# Patient Record
Sex: FEMALE | URBAN - METROPOLITAN AREA
[De-identification: names, ages, dates, MRNs, and addresses within clinical notes are randomized per-mention and may not be internally consistent; named-entity substitution may affect disease eponyms.]

---

## 2017-03-16 ENCOUNTER — TELEPHONE (OUTPATIENT)
Dept: ORTHOPEDICS | Facility: CLINIC | Age: 22
End: 2017-03-16

## 2017-03-16 DIAGNOSIS — M25.562 LEFT KNEE PAIN: Primary | ICD-10-CM

## 2017-03-16 NOTE — TELEPHONE ENCOUNTER
Patient's father called in saying Jonathon is c/o increased left knee pain upon walking and using the stairs x 2 days. Does not note redness, swelling or warmth. She will follow up with Dr. Solomon.

## 2017-03-20 ENCOUNTER — OFFICE VISIT (OUTPATIENT)
Dept: ORTHOPEDICS | Facility: CLINIC | Age: 22
End: 2017-03-20

## 2017-03-20 VITALS — BODY MASS INDEX: 39.1 KG/M2 | HEIGHT: 68 IN | WEIGHT: 258 LBS

## 2017-03-20 DIAGNOSIS — G89.29 CHRONIC PAIN OF LEFT KNEE: Primary | ICD-10-CM

## 2017-03-20 DIAGNOSIS — M25.562 CHRONIC PAIN OF LEFT KNEE: Primary | ICD-10-CM

## 2017-03-20 RX ORDER — DESOGESTREL AND ETHINYL ESTRADIOL 21-5 (28)
1 KIT ORAL
COMMUNITY
Start: 2017-02-10

## 2017-03-20 RX ORDER — VILAZODONE HYDROCHLORIDE 40 MG/1
TABLET ORAL
COMMUNITY
Start: 2015-12-30

## 2017-03-20 RX ORDER — TRAZODONE HYDROCHLORIDE 100 MG/1
1 TABLET ORAL
COMMUNITY
Start: 2016-01-05

## 2017-03-20 ASSESSMENT — ENCOUNTER SYMPTOMS
DEPRESSION: 1
INSOMNIA: 1
FEVER: 0
WEIGHT LOSS: 0
DECREASED CONCENTRATION: 1
CHILLS: 0
ALTERED TEMPERATURE REGULATION: 0
HALLUCINATIONS: 0
WEIGHT GAIN: 0
FATIGUE: 1
INCREASED ENERGY: 1
NERVOUS/ANXIOUS: 1
DECREASED APPETITE: 1
POLYPHAGIA: 0
NIGHT SWEATS: 0
PANIC: 1
POLYDIPSIA: 0

## 2017-03-20 NOTE — LETTER
Return to Work  2017     Seen today: yes    Patient:  Jonathon Mari  :   1995  MRN:     [id number]  Physician: ELIEL SOLOMON    Jonathon Mari may return to work on Date: 3/20/2017.      Patient limitations:  Patient is cleared to return to work with no restrictions.        Electronically signed by Eliel Solomon MD

## 2017-03-20 NOTE — NURSING NOTE
"Reason For Visit:   Chief Complaint   Patient presents with     RECHECK     Increased acute left knee pain (DOS 5/19/15, ACL BTB, repair FCL and popliteus tendon)     Age: 21 year old    Occupation: Student    Date of injury: 3/15/17, can't recall injury, but used crutches. Has been improving    Date of surgery: 5/19/15    Smoker: No    Pain Assessment  Patient Currently in Pain: Yes  Primary Pain Location: Knee  Pain Orientation: Left  Pain Descriptors: Aching  Alleviating Factors:  (Used crutches)  Aggravating Factors: Movement, Bending    Ht 1.715 m (5' 7.5\")  Wt 117 kg (258 lb)  BMI 39.81 kg/m2                                                   "

## 2017-03-20 NOTE — LETTER
3/20/2017       RE: Jonathon Mari  [address] [address]     Dear Colleague,    Thank you for referring your patient, Jonathon Mari, to the [facility] at [facility]. Please see a copy of my visit note below.    HISTORY OF PRESENT ILLNESS:  Jonathon is a pleasant 21-year-old woman who presents to see me today in return.  She underwent ACL and posterolateral corner reconstruction 5/19/2015.  She did very well from this and was able to return to all desired activities; however, over the course of the last week, she has noted the onset of knee pain.  This has been worse with doing a fair amount of kneeling while visiting her grandmother in the nursing home and now she has had increasing knee pain.  It was actually quite painful to her, even to the point where she had to go in and was seen by an outside orthopedist; x-rays were obtained and showed no definite fracture.  She has tried some Tylenol which has quieted it down; actually today she is much improved.  She is nearly back to her pre-injury state.      PHYSICAL EXAMINATION:  On exam, jeremias woman in no acute distress, articulate and interactive.  Visual inspection shows no redness, no drainage or suggestion of infection.  Range of motion is full.  Lachman 0, no pivot shift.  No opening to varus stress testing.  Palpable fibular collateral ligament graft.      IMAGING:  Plain films from the outside hospital show a BTB autograft ACL reconstruction and posterolateral corner reconstruction with tunnels and hardware in good position.  Some resorption of the screws is noted, which is normal.      CLINICAL ASSESSMENT:  Two years status post anterior cruciate ligament, posterolateral corner reconstruction with knee pain, though it is now resolved.  No specific inciting event.      PLAN:  I had a long discussion today with Jonathon.  At this time, I think she is doing very well.  I am glad she is  already recovering.  We are going to let her take ibuprofen and Tylenol as needed.  If this should flare in the future, I think a course of Ibuprofen would be reasonable.  She should recommit herself to a home therapy program.  She can follow up with me on an as-needed basis going forward.         Again, thank you for allowing me to participate in the care of your patient.      Sincerely,    Kevin Solomon MD

## 2017-03-20 NOTE — MR AVS SNAPSHOT
"              After Visit Summary   3/20/2017    Jonathon Mari    MRN: [id number]           Patient Information     Date Of Birth          1995        Visit Information        Provider Department      3/20/2017 8:40 AM Kevin Solomon MD [facility]        Today's Diagnoses     Chronic pain of left knee    -  1       Follow-ups after your visit        Who to contact     Please call your clinic at [phone] to:    Ask questions about your health    Make or cancel appointments    Discuss your medicines    Learn about your test results    Speak to your doctor   If you have compliments or concerns about an experience at your clinic, or if you wish to file a complaint, please contact [facility] Patient Relations at [phone] or email us at [email or web address]         Additional Information About Your Visit        MyChart Information     [employer] is an electronic gateway that provides easy, online access to your medical records. With [employer], you can request a clinic appointment, read your test results, renew a prescription or communicate with your care team.     To sign up for [employer] visit the website at [email or web address]   You will be asked to enter the access code listed below, as well as some personal information. Please follow the directions to create your username and password.     Your access code is: [email or web address]  Expires: 6/15/2017  6:30 AM     Your access code will  in 90 days. If you need help or a new code, please contact your [facility] or call [phone] for assistance.        Care EveryWhere ID     This is your Care EveryWhere ID. This could be used by other organizations to access your [place] medical records  [id number]        Your Vitals Were     Height BMI (Body Mass Index)                5' 7.5\" (1.715 m) 39.81 kg/m2           Blood Pressure from Last 3 Encounters: "   05/20/15 125/75    Weight from Last 3 Encounters:   03/20/17 258 lb (117 kg) (>99 %)*   05/19/15 216 lb 8 oz (98.2 kg) (99 %)*     * Growth percentiles are based on [facility] 2-20 Years data.              Today, you had the following     No orders found for display         Today's Medication Changes          These changes are accurate as of: 3/20/17 11:59 PM.  If you have any questions, ask your nurse or doctor.               Stop taking these medicines if you haven't already. Please contact your care team if you have questions.     acetaminophen 650 MG 8 hour tablet           BUSPAR PO           EFFEXOR PO           ondansetron 4 MG ODT tab   Commonly known as:  ZOFRAN-ODT           order for DME           polyethylene glycol powder   Commonly known as:  MIRALAX/GLYCOLAX           WELLBUTRIN PO           ZYRTEC PO                    Primary Care Provider Office Phone # Fax #    Shawna Mayen [phone]       [facility] [address] [place] [id number]        Thank you!     Thank you for choosing [facility]  for your care. Our goal is always to provide you with excellent care. Hearing back from our patients is one way we can continue to improve our services. Please take a few minutes to complete the written survey that you may receive in the mail after your visit with us. Thank you!             Your Updated Medication List - Protect others around you: Learn how to safely use, store and throw away your medicines at www.disposemymeds.org.          This list is accurate as of: 3/20/17 11:59 PM.  Always use your most recent med list.                   Brand Name Dispense Instructions for use    desogestrel-ethinyl estradiol 0.15-0.02/0.01 MG (21/5) per tablet    KARIVA     Take 1 tablet by mouth       traZODone 100 MG tablet    DESYREL     Take 1 tablet by mouth       VIIBRYD 40 MG Tabs tablet   Generic drug:  vilazodone

## 2017-03-20 NOTE — PROGRESS NOTES
HISTORY OF PRESENT ILLNESS:  Jonathon is a pleasant 21-year-old woman who presents to see me today in return.  She underwent ACL and posterolateral corner reconstruction 5/19/2015.  She did very well from this and was able to return to all desired activities; however, over the course of the last week, she has noted the onset of knee pain.  This has been worse with doing a fair amount of kneeling while visiting her grandmother in the nursing home and now she has had increasing knee pain.  It was actually quite painful to her, even to the point where she had to go in and was seen by an outside orthopedist; x-rays were obtained and showed no definite fracture.  She has tried some Tylenol which has quieted it down; actually today she is much improved.  She is nearly back to her pre-injury state.      PHYSICAL EXAMINATION:  On exam, jeremias woman in no acute distress, articulate and interactive.  Visual inspection shows no redness, no drainage or suggestion of infection.  Range of motion is full.  Lachman 0, no pivot shift.  No opening to varus stress testing.  Palpable fibular collateral ligament graft.      IMAGING:  Plain films from the outside hospital show a BTB autograft ACL reconstruction and posterolateral corner reconstruction with tunnels and hardware in good position.  Some resorption of the screws is noted, which is normal.      CLINICAL ASSESSMENT:  Two years status post anterior cruciate ligament, posterolateral corner reconstruction with knee pain, though it is now resolved.  No specific inciting event.      PLAN:  I had a long discussion today with Jonathon.  At this time, I think she is doing very well.  I am glad she is already recovering.  We are going to let her take ibuprofen and Tylenol as needed.  If this should flare in the future, I think a course of Ibuprofen would be reasonable.  She should recommit herself to a home therapy program.  She can follow up with me on an as-needed basis going forward.